# Patient Record
Sex: FEMALE | Race: WHITE | NOT HISPANIC OR LATINO | Employment: FULL TIME | URBAN - METROPOLITAN AREA
[De-identification: names, ages, dates, MRNs, and addresses within clinical notes are randomized per-mention and may not be internally consistent; named-entity substitution may affect disease eponyms.]

---

## 2019-09-16 ENCOUNTER — HOSPITAL ENCOUNTER (EMERGENCY)
Facility: HOSPITAL | Age: 39
Discharge: HOME | End: 2019-09-16
Attending: EMERGENCY MEDICINE | Admitting: EMERGENCY MEDICINE

## 2019-09-16 ENCOUNTER — APPOINTMENT (OUTPATIENT)
Dept: GENERAL RADIOLOGY | Facility: HOSPITAL | Age: 39
End: 2019-09-16

## 2019-09-16 VITALS
TEMPERATURE: 99.1 F | WEIGHT: 218 LBS | OXYGEN SATURATION: 100 % | DIASTOLIC BLOOD PRESSURE: 83 MMHG | RESPIRATION RATE: 18 BRPM | SYSTOLIC BLOOD PRESSURE: 115 MMHG | HEIGHT: 65 IN | BODY MASS INDEX: 36.32 KG/M2 | HEART RATE: 96 BPM

## 2019-09-16 DIAGNOSIS — R07.9 CHEST PAIN, UNSPECIFIED TYPE: Primary | ICD-10-CM

## 2019-09-16 DIAGNOSIS — R00.0 SINUS TACHYCARDIA: ICD-10-CM

## 2019-09-16 LAB
ALBUMIN SERPL-MCNC: 4.3 G/DL (ref 3.5–5.2)
ALBUMIN/GLOB SERPL: 1.1 G/DL
ALP SERPL-CCNC: 106 U/L (ref 39–117)
ALT SERPL W P-5'-P-CCNC: 13 U/L (ref 1–33)
ANION GAP SERPL CALCULATED.3IONS-SCNC: 12 MMOL/L (ref 5–15)
AST SERPL-CCNC: 12 U/L (ref 1–32)
BASOPHILS # BLD AUTO: 0.07 10*3/MM3 (ref 0–0.2)
BASOPHILS NFR BLD AUTO: 0.5 % (ref 0–1.5)
BILIRUB SERPL-MCNC: <0.2 MG/DL (ref 0.2–1.2)
BUN BLD-MCNC: 8 MG/DL (ref 6–20)
BUN/CREAT SERPL: 11.6 (ref 7–25)
CALCIUM SPEC-SCNC: 9.1 MG/DL (ref 8.6–10.5)
CHLORIDE SERPL-SCNC: 105 MMOL/L (ref 98–107)
CO2 SERPL-SCNC: 24 MMOL/L (ref 22–29)
CREAT BLD-MCNC: 0.69 MG/DL (ref 0.57–1)
D DIMER PPP FEU-MCNC: <0.27 MCGFEU/ML (ref 0–0.56)
DEPRECATED RDW RBC AUTO: 43.1 FL (ref 37–54)
EOSINOPHIL # BLD AUTO: 0.12 10*3/MM3 (ref 0–0.4)
EOSINOPHIL NFR BLD AUTO: 0.9 % (ref 0.3–6.2)
ERYTHROCYTE [DISTWIDTH] IN BLOOD BY AUTOMATED COUNT: 14 % (ref 12.3–15.4)
GFR SERPL CREATININE-BSD FRML MDRD: 95 ML/MIN/1.73
GLOBULIN UR ELPH-MCNC: 3.8 GM/DL
GLUCOSE BLD-MCNC: 109 MG/DL (ref 65–99)
HCT VFR BLD AUTO: 43.5 % (ref 34–46.6)
HGB BLD-MCNC: 14.2 G/DL (ref 12–15.9)
HOLD SPECIMEN: NORMAL
HOLD SPECIMEN: NORMAL
IMM GRANULOCYTES # BLD AUTO: 0.04 10*3/MM3 (ref 0–0.05)
IMM GRANULOCYTES NFR BLD AUTO: 0.3 % (ref 0–0.5)
LIPASE SERPL-CCNC: 20 U/L (ref 13–60)
LYMPHOCYTES # BLD AUTO: 3.26 10*3/MM3 (ref 0.7–3.1)
LYMPHOCYTES NFR BLD AUTO: 25.3 % (ref 19.6–45.3)
MCH RBC QN AUTO: 27.6 PG (ref 26.6–33)
MCHC RBC AUTO-ENTMCNC: 32.6 G/DL (ref 31.5–35.7)
MCV RBC AUTO: 84.5 FL (ref 79–97)
MONOCYTES # BLD AUTO: 0.84 10*3/MM3 (ref 0.1–0.9)
MONOCYTES NFR BLD AUTO: 6.5 % (ref 5–12)
NEUTROPHILS # BLD AUTO: 8.55 10*3/MM3 (ref 1.7–7)
NEUTROPHILS NFR BLD AUTO: 66.5 % (ref 42.7–76)
NRBC BLD AUTO-RTO: 0 /100 WBC (ref 0–0.2)
NT-PROBNP SERPL-MCNC: 15.2 PG/ML (ref 5–450)
PLATELET # BLD AUTO: 307 10*3/MM3 (ref 140–450)
PMV BLD AUTO: 11.8 FL (ref 6–12)
POTASSIUM BLD-SCNC: 3.5 MMOL/L (ref 3.5–5.2)
PROT SERPL-MCNC: 8.1 G/DL (ref 6–8.5)
RBC # BLD AUTO: 5.15 10*6/MM3 (ref 3.77–5.28)
SODIUM BLD-SCNC: 141 MMOL/L (ref 136–145)
TROPONIN T SERPL-MCNC: <0.01 NG/ML (ref 0–0.03)
TROPONIN T SERPL-MCNC: <0.01 NG/ML (ref 0–0.03)
TSH SERPL DL<=0.05 MIU/L-ACNC: 4 UIU/ML (ref 0.27–4.2)
WBC NRBC COR # BLD: 12.88 10*3/MM3 (ref 3.4–10.8)
WHOLE BLOOD HOLD SPECIMEN: NORMAL
WHOLE BLOOD HOLD SPECIMEN: NORMAL

## 2019-09-16 PROCEDURE — 71045 X-RAY EXAM CHEST 1 VIEW: CPT

## 2019-09-16 PROCEDURE — 85379 FIBRIN DEGRADATION QUANT: CPT | Performed by: PHYSICIAN ASSISTANT

## 2019-09-16 PROCEDURE — 85025 COMPLETE CBC W/AUTO DIFF WBC: CPT | Performed by: EMERGENCY MEDICINE

## 2019-09-16 PROCEDURE — 99284 EMERGENCY DEPT VISIT MOD MDM: CPT

## 2019-09-16 PROCEDURE — 80053 COMPREHEN METABOLIC PANEL: CPT | Performed by: EMERGENCY MEDICINE

## 2019-09-16 PROCEDURE — 93005 ELECTROCARDIOGRAM TRACING: CPT | Performed by: EMERGENCY MEDICINE

## 2019-09-16 PROCEDURE — 83880 ASSAY OF NATRIURETIC PEPTIDE: CPT | Performed by: EMERGENCY MEDICINE

## 2019-09-16 PROCEDURE — 84484 ASSAY OF TROPONIN QUANT: CPT | Performed by: EMERGENCY MEDICINE

## 2019-09-16 PROCEDURE — 84443 ASSAY THYROID STIM HORMONE: CPT | Performed by: PHYSICIAN ASSISTANT

## 2019-09-16 PROCEDURE — 83690 ASSAY OF LIPASE: CPT | Performed by: EMERGENCY MEDICINE

## 2019-09-16 RX ORDER — SODIUM CHLORIDE 0.9 % (FLUSH) 0.9 %
10 SYRINGE (ML) INJECTION AS NEEDED
Status: DISCONTINUED | OUTPATIENT
Start: 2019-09-16 | End: 2019-09-16 | Stop reason: HOSPADM

## 2019-09-16 RX ADMIN — SODIUM CHLORIDE 1000 ML: 9 INJECTION, SOLUTION INTRAVENOUS at 09:10

## 2019-09-16 NOTE — DISCHARGE INSTRUCTIONS
Follow up with one of the [facility] Primary Care Providers below to setup primary care. If you need assistance coordinating a primary care appointment with a [facility] Primary Care Provider, please contact the Primary Care Coordinators at [phone] for appointment scheduling.    [facility], Primary Care   [address] , [address]   [place], [place] [id number] [phone]    [facility] Internal Medicine & Endocrinology  [address], [address]  [place] [address] [phone]    [facility] Family Medicine  [address], [address]   [place] [address] [phone]    [facility] Primary Care  [address], [address]  [place], [place] [address] [phone]    [facility], Primary Care,   [address], [address]   [place], [place] [id number] [phone]    [facility] Primary Care  [address]  [place], [place] [id number] [phone]    [facility], Primary Care  [address], [address]  [place], [place] [address] [phone]    [facility] Internal Medicine & Pediatrics  [address], [address]   [place] [address] [phone]    [facility], Primary Care  [address] [place] [address] [phone]      [facility] Primary Care  [address], [address]   [place] [address] [phone]    [facility] Family Medicine  [address] [place] [address] [phone]

## 2019-09-16 NOTE — ED PROVIDER NOTES
Subjective   Pt is a 38 yo female presenting to ED with complaints of chest pain and elevated heart rate. She noticed mid sternal chest pressure about 7 am this morning. She works at [employer] and they took her HR there and noticed 110-120. Pt went to [facility] and then sent to ED. On arrival to ED chest pressure improved but still tachycardiac at 107. She denies headache, visual changes, dizziness, SOB, N/V, abdominal  Pain or leg swelling. She denies prior cardiac hx or cardiac workup. Her father has hx of Afib. She denies any excessive caffeine / energy drinks or drug use. She quit tobacco use 5 years ago. The only medication she takes is Depo.         History provided by:  Patient  Chest Pain   Pain location:  Substernal area  Pain quality: pressure    Pain radiates to:  Does not radiate  Pain severity:  Mild  Onset quality:  Sudden  Duration:  3 hours  Progression:  Improving  Chronicity:  New  Context: at rest    Relieved by:  Nothing  Worsened by:  Nothing  Ineffective treatments:  None tried  Associated symptoms: no abdominal pain, no anxiety, no back pain, no cough, no diaphoresis, no dizziness, no dysphagia, no fatigue, no fever, no headache, no lower extremity edema, no nausea, no near-syncope, no numbness, no palpitations, no shortness of breath, no syncope, no vomiting and no weakness    Risk factors: birth control and obesity    Risk factors: no coronary artery disease, no diabetes mellitus, no high cholesterol, no hypertension, no immobilization, no prior DVT/PE, no smoking and no surgery        Review of Systems   Constitutional: Negative for chills, diaphoresis, fatigue and fever.   HENT: Negative for congestion, ear pain, sore throat and trouble swallowing.    Eyes: Negative for pain, redness and visual disturbance.   Respiratory: Negative for cough, chest tightness and shortness of breath.    Cardiovascular: Positive for chest pain. Negative for palpitations, leg swelling, syncope and  near-syncope.   Gastrointestinal: Negative for abdominal pain, constipation, diarrhea, nausea and vomiting.   Genitourinary: Negative for difficulty urinating, dysuria, flank pain, hematuria and vaginal bleeding.   Musculoskeletal: Negative for arthralgias, back pain and joint swelling.   Skin: Negative for rash and wound.   Neurological: Negative for dizziness, syncope, speech difficulty, weakness, numbness and headaches.   Psychiatric/Behavioral: Negative for confusion.   All other systems reviewed and are negative.      History reviewed. No pertinent past medical history.    Allergies   Allergen Reactions   • Naproxen Anaphylaxis   • Biaxin [Clarithromycin] Rash   • Erythromycin Rash       Past Surgical History:   Procedure Laterality Date   • EYE SURGERY         History reviewed. No pertinent family history.    Social History     Socioeconomic History   • Marital status:      Spouse name: Not on file   • Number of children: Not on file   • Years of education: Not on file   • Highest education level: Not on file   Tobacco Use   • Smoking status: Former Smoker   • Tobacco comment: quit 5 years ago   Substance and Sexual Activity   • Alcohol use: No     Frequency: Never   • Drug use: No           Objective   Physical Exam   Constitutional: She is oriented to person, place, and time. Vital signs are normal. She appears well-developed.   HENT:   Head: Atraumatic.   Nose: Nose normal.   Mouth/Throat: Mucous membranes are normal.   Eyes: Conjunctivae, EOM and lids are normal. Pupils are equal, round, and reactive to light.   Neck: Normal range of motion. Neck supple.   Cardiovascular: Regular rhythm and normal heart sounds. Tachycardia present.   Pulmonary/Chest: Effort normal and breath sounds normal. She has no wheezes.   Abdominal: Soft. She exhibits no distension. There is no tenderness. There is no rebound and no guarding.   Musculoskeletal: Normal range of motion. She exhibits no edema or tenderness.    Neurological: She is alert and oriented to person, place, and time. No sensory deficit.   Skin: Skin is warm and dry. No rash noted. No erythema.   Psychiatric: She has a normal mood and affect. Her speech is normal and behavior is normal.   Nursing note and vitals reviewed.      Procedures           ED Course      RE-examined patient several times in ED. Pt feeling better and no recurrent CP. HR improved. Discussed results and tx plan for f/u with cardiac clinic. Rx for Metoprolol 25 mg provided. Pt instructed to only take if HR over 100 and BP over 120/80 with max dose of BID. She will return to ED if new or worse sx.     Discussed patient with Dr. Smalls who is agreeable with ED course and tx plan. Will Rx Metoprolol as discussed above.     Recent Results (from the past 24 hour(s))   Troponin    Collection Time: 09/16/19  8:51 AM   Result Value Ref Range    Troponin T <0.010 0.000 - 0.030 ng/mL   Comprehensive Metabolic Panel    Collection Time: 09/16/19  8:51 AM   Result Value Ref Range    Glucose 109 (H) 65 - 99 mg/dL    BUN 8 6 - 20 mg/dL    Creatinine 0.69 0.57 - 1.00 mg/dL    Sodium 141 136 - 145 mmol/L    Potassium 3.5 3.5 - 5.2 mmol/L    Chloride 105 98 - 107 mmol/L    CO2 24.0 22.0 - 29.0 mmol/L    Calcium 9.1 8.6 - 10.5 mg/dL    Total Protein 8.1 6.0 - 8.5 g/dL    Albumin 4.30 3.50 - 5.20 g/dL    ALT (SGPT) 13 1 - 33 U/L    AST (SGOT) 12 1 - 32 U/L    Alkaline Phosphatase 106 39 - 117 U/L    Total Bilirubin <0.2 (L) 0.2 - 1.2 mg/dL    eGFR Non African Amer 95 >60 mL/min/1.73    Globulin 3.8 gm/dL    A/G Ratio 1.1 g/dL    BUN/Creatinine Ratio 11.6 7.0 - 25.0    Anion Gap 12.0 5.0 - 15.0 mmol/L   Lipase    Collection Time: 09/16/19  8:51 AM   Result Value Ref Range    Lipase 20 13 - 60 U/L   BNP    Collection Time: 09/16/19  8:51 AM   Result Value Ref Range    proBNP 15.2 5.0 - 450.0 pg/mL   Light Blue Top    Collection Time: 09/16/19  8:51 AM   Result Value Ref Range    Extra Tube hold for add-on     Green Top (Gel)    Collection Time: 09/16/19  8:51 AM   Result Value Ref Range    Extra Tube Hold for add-ons.    Lavender Top    Collection Time: 09/16/19  8:51 AM   Result Value Ref Range    Extra Tube hold for add-on    Gold Top - SST    Collection Time: 09/16/19  8:51 AM   Result Value Ref Range    Extra Tube Hold for add-ons.    CBC Auto Differential    Collection Time: 09/16/19  8:51 AM   Result Value Ref Range    WBC 12.88 (H) 3.40 - 10.80 10*3/mm3    RBC 5.15 3.77 - 5.28 10*6/mm3    Hemoglobin 14.2 12.0 - 15.9 g/dL    Hematocrit 43.5 34.0 - 46.6 %    MCV 84.5 79.0 - 97.0 fL    MCH 27.6 26.6 - 33.0 pg    MCHC 32.6 31.5 - 35.7 g/dL    RDW 14.0 12.3 - 15.4 %    RDW-SD 43.1 37.0 - 54.0 fl    MPV 11.8 6.0 - 12.0 fL    Platelets 307 140 - 450 10*3/mm3    Neutrophil % 66.5 42.7 - 76.0 %    Lymphocyte % 25.3 19.6 - 45.3 %    Monocyte % 6.5 5.0 - 12.0 %    Eosinophil % 0.9 0.3 - 6.2 %    Basophil % 0.5 0.0 - 1.5 %    Immature Grans % 0.3 0.0 - 0.5 %    Neutrophils, Absolute 8.55 (H) 1.70 - 7.00 10*3/mm3    Lymphocytes, Absolute 3.26 (H) 0.70 - 3.10 10*3/mm3    Monocytes, Absolute 0.84 0.10 - 0.90 10*3/mm3    Eosinophils, Absolute 0.12 0.00 - 0.40 10*3/mm3    Basophils, Absolute 0.07 0.00 - 0.20 10*3/mm3    Immature Grans, Absolute 0.04 0.00 - 0.05 10*3/mm3    nRBC 0.0 0.0 - 0.2 /100 WBC   TSH    Collection Time: 09/16/19  8:51 AM   Result Value Ref Range    TSH 4.000 0.270 - 4.200 uIU/mL   D-dimer, Quantitative    Collection Time: 09/16/19  8:51 AM   Result Value Ref Range    D-Dimer, Quantitative <0.27 0.00 - 0.56 MCGFEU/mL   Troponin    Collection Time: 09/16/19 10:32 AM   Result Value Ref Range    Troponin T <0.010 0.000 - 0.030 ng/mL     Note: In addition to lab results from this visit, the labs listed above may include labs taken at another facility or during a different encounter within the last 24 hours. Please correlate lab times with ED admission and discharge times for further clarification of the  services performed during this visit.    XR Chest 1 View   Final Result   Mild chronic changes in the lung fields with no evidence of   acute parenchymal disease.       D:  09/16/2019   E:  09/16/2019       This report was finalized on 9/16/2019 4:14 PM by Dr. Trinh Felder MD.            Vitals:    09/16/19 0945 09/16/19 1000 09/16/19 1045 09/16/19 1140   BP: 131/86 124/80 115/83    BP Location:       Patient Position:       Pulse: 103 91 108 96   Resp:       Temp:       SpO2: 100% 100% 100%    Weight:       Height:         Medications   sodium chloride 0.9 % bolus 1,000 mL (0 mL Intravenous Stopped 9/16/19 1010)     ECG/EMG Results (last 24 hours)     Procedure Component Value Units Date/Time    ECG 12 Lead [[id number]] Collected:  09/16/19 0841     Updated:  09/16/19 0858    ECG 12 Lead [[id number]] Collected:  09/16/19 1029     Updated:  09/16/19 1127    Narrative:       Test Reason : 2ND SET  Blood Pressure : **/** mmHG  Vent. Rate : 091 BPM     Atrial Rate : 091 BPM     P-R Int : 128 ms          QRS Dur : 074 ms      QT Int : 360 ms       P-R-T Axes : 029 013 017 degrees     QTc Int : 442 ms    Normal sinus rhythm  Normal ECG  When compared with ECG of 16-SEP-2019 08:41, (Unconfirmed)  No significant change was found  Confirmed by JENI MACHADO MD ([id number]) on 9/16/2019 11:26:54 AM    Referred By:  RUBY           Confirmed By:JENI MACHADO MD        ECG 12 Lead   Final Result   Test Reason : 2ND SET   Blood Pressure : **/** mmHG   Vent. Rate : 091 BPM     Atrial Rate : 091 BPM      P-R Int : 128 ms          QRS Dur : 074 ms       QT Int : 360 ms       P-R-T Axes : 029 013 017 degrees      QTc Int : 442 ms      Normal sinus rhythm   Normal ECG   When compared with ECG of 16-SEP-2019 08:41, (Unconfirmed)   No significant change was found   Confirmed by JENI MACHADO MD ([id number]) on 9/16/2019 11:26:54 AM      Referred By:  RUBY           Confirmed By:JENI MACHADO MD      ECG 12 Lead                          MDM    Final diagnoses:   Chest pain, unspecified type   Sinus tachycardia              Emeli Acosta, PA  09/16/19 5300

## 2019-09-18 NOTE — PROGRESS NOTES
"[facility]  Heart and Valve Center      Encounter Date:09/19/2019     Sherlyn Brewer  [address] [place] [address] [address] [place] [address]  [unfilled]    1980    Provider, No Known    Sherlyn Brewer is a 39 y.o. female.      Subjective:     Chief Complaint:  Chest Pain and Establish Care       HPI   Patient is a 39-year-old female with no prior cardiac history who presents to the chest pain clinic at the request of Emeli Acosta/Dr. Smalls.  Patient presented to the ED on 9/16/2019 with acute midsternal chest pressure.  Symptoms developed while she was at work and her coworkers to her heart rate and noticed it to be around 110-120.  Patient presented to the [facility] and I sent her to the ED.  Heart rate was 107 upon arrival to the ED.  Work-up in ED was unremarkable.  She was started on metoprolol tartrate PRN HR >100. She has not taken any doses. She reports typical resting HR in the 80s but now mostly 90-100s. No irregular heart beats. Episodes of heart racing when standing up to 135 according to her fitbit. Does notice racing heart beat worse when going from sitting to standing. Chest pressure intermittent but not always related to fast heart rast.  Described as a \"Dull ache\" at rest that occurs randomly and last for approximately 30 minutes.  No associated cough or palpable tenderness.  No exertional chest pain. No recent infection. No stimulants.     Cardiac Risks: Obesity, sedentary lifestyle, father has afib (diagnosed in the 40s?)      History reviewed. No pertinent past medical history.    Past Surgical History:   Procedure Laterality Date   • EYE SURGERY         Family History   Problem Relation Age of Onset   • Depression Mother    • Anxiety disorder Mother    • Atrial fibrillation Father    • Depression Sister    • Asthma Sister    • Hypertension Maternal Grandmother    • Kidney disease Maternal Grandfather    • Heart block Maternal Grandfather    • No Known Problems Paternal Grandmother    • No " Known Problems Paternal Grandfather        Social History     Socioeconomic History   • Marital status:      Spouse name: Not on file   • Number of children: Not on file   • Years of education: Not on file   • Highest education level: Not on file   Tobacco Use   • Smoking status: Former Smoker   • Smokeless tobacco: Never Used   • Tobacco comment: quit 5 years ago   Substance and Sexual Activity   • Alcohol use: No     Frequency: Never   • Drug use: No   • Sexual activity: Defer   Social History Narrative    caffeine use: occassionally       Allergies   Allergen Reactions   • Naproxen Anaphylaxis   • Biaxin [Clarithromycin] Rash   • Erythromycin Rash         Current Outpatient Medications:   •  metoprolol tartrate (LOPRESSOR) 25 MG tablet, Take 1 tablet by mouth 2 (Two) Times a Day for 14 days. (Patient taking differently: Take 25 mg by mouth Daily As Needed (HR >100).), Disp: 28 tablet, Rfl: 0    The following portions of the patient's history were reviewed today and updated as appropriate: allergies, current medications, past family history, past medical history, past social history, past surgical history and problem list     Review of Systems   Constitution: Positive for weakness and malaise/fatigue. Negative for chills and fever.   HENT: Negative.    Eyes: Negative.    Cardiovascular: Positive for chest pain, dyspnea on exertion and palpitations. Negative for claudication, cyanosis, irregular heartbeat, leg swelling, near-syncope, orthopnea, paroxysmal nocturnal dyspnea and syncope.   Respiratory: Negative for cough, shortness of breath and snoring.    Endocrine: Negative.    Hematologic/Lymphatic: Does not bruise/bleed easily.   Skin: Negative for poor wound healing.   Musculoskeletal: Negative.    Gastrointestinal: Negative for abdominal pain, heartburn, hematemesis, melena, nausea and vomiting.   Genitourinary: Negative.  Negative for hematuria.   Psychiatric/Behavioral: Negative.   "  Allergic/Immunologic: Negative.        Objective:     Vitals:    09/19/19 0855 09/19/19 0858 09/19/19 0859   BP: 120/84 123/86 112/82   BP Location: Right arm Left arm Left arm   Patient Position: Sitting Sitting Standing   Cuff Size: Adult Adult Adult   Pulse: 100  112   Resp: 18     Temp: 98 °F (36.7 °C)     TempSrc: Temporal     SpO2: 98%  99%   Weight: 99.9 kg (220 lb 4 oz)     Height: 165.1 cm (65\")         Physical Exam   Constitutional: She is oriented to person, place, and time. She appears well-developed and well-nourished. No distress.   HENT:   Head: Normocephalic.   Eyes: Conjunctivae are normal. Pupils are equal, round, and reactive to light.   Neck: Neck supple. No JVD present. No thyromegaly present.   Cardiovascular: Normal rate, regular rhythm, normal heart sounds and intact distal pulses. Exam reveals no gallop and no friction rub.   No murmur heard.  Pulmonary/Chest: Effort normal and breath sounds normal. No respiratory distress. She has no wheezes. She has no rales. She exhibits no tenderness.   Abdominal: Soft. Bowel sounds are normal.   Musculoskeletal: Normal range of motion. She exhibits no edema.   Neurological: She is alert and oriented to person, place, and time.   Skin: Skin is warm and dry.   Psychiatric: She has a normal mood and affect. Her behavior is normal. Thought content normal.   Vitals reviewed.      Lab and Diagnostic Review:  Results for orders placed or performed during the hospital encounter of 09/16/19   Troponin   Result Value Ref Range    Troponin T <0.010 0.000 - 0.030 ng/mL   Comprehensive Metabolic Panel   Result Value Ref Range    Glucose 109 (H) 65 - 99 mg/dL    BUN 8 6 - 20 mg/dL    Creatinine 0.69 0.57 - 1.00 mg/dL    Sodium 141 136 - 145 mmol/L    Potassium 3.5 3.5 - 5.2 mmol/L    Chloride 105 98 - 107 mmol/L    CO2 24.0 22.0 - 29.0 mmol/L    Calcium 9.1 8.6 - 10.5 mg/dL    Total Protein 8.1 6.0 - 8.5 g/dL    Albumin 4.30 3.50 - 5.20 g/dL    ALT (SGPT) 13 1 - " 33 U/L    AST (SGOT) 12 1 - 32 U/L    Alkaline Phosphatase 106 39 - 117 U/L    Total Bilirubin <0.2 (L) 0.2 - 1.2 mg/dL    eGFR Non African Amer 95 >60 mL/min/1.73    Globulin 3.8 gm/dL    A/G Ratio 1.1 g/dL    BUN/Creatinine Ratio 11.6 7.0 - 25.0    Anion Gap 12.0 5.0 - 15.0 mmol/L   Lipase   Result Value Ref Range    Lipase 20 13 - 60 U/L   BNP   Result Value Ref Range    proBNP 15.2 5.0 - 450.0 pg/mL   CBC Auto Differential   Result Value Ref Range    WBC 12.88 (H) 3.40 - 10.80 10*3/mm3    RBC 5.15 3.77 - 5.28 10*6/mm3    Hemoglobin 14.2 12.0 - 15.9 g/dL    Hematocrit 43.5 34.0 - 46.6 %    MCV 84.5 79.0 - 97.0 fL    MCH 27.6 26.6 - 33.0 pg    MCHC 32.6 31.5 - 35.7 g/dL    RDW 14.0 12.3 - 15.4 %    RDW-SD 43.1 37.0 - 54.0 fl    MPV 11.8 6.0 - 12.0 fL    Platelets 307 140 - 450 10*3/mm3    Neutrophil % 66.5 42.7 - 76.0 %    Lymphocyte % 25.3 19.6 - 45.3 %    Monocyte % 6.5 5.0 - 12.0 %    Eosinophil % 0.9 0.3 - 6.2 %    Basophil % 0.5 0.0 - 1.5 %    Immature Grans % 0.3 0.0 - 0.5 %    Neutrophils, Absolute 8.55 (H) 1.70 - 7.00 10*3/mm3    Lymphocytes, Absolute 3.26 (H) 0.70 - 3.10 10*3/mm3    Monocytes, Absolute 0.84 0.10 - 0.90 10*3/mm3    Eosinophils, Absolute 0.12 0.00 - 0.40 10*3/mm3    Basophils, Absolute 0.07 0.00 - 0.20 10*3/mm3    Immature Grans, Absolute 0.04 0.00 - 0.05 10*3/mm3    nRBC 0.0 0.0 - 0.2 /100 WBC   TSH   Result Value Ref Range    TSH 4.000 0.270 - 4.200 uIU/mL   D-dimer, Quantitative   Result Value Ref Range    D-Dimer, Quantitative <0.27 0.00 - 0.56 MCGFEU/mL   Troponin   Result Value Ref Range    Troponin T <0.010 0.000 - 0.030 ng/mL     EKG 9/16/19 NSR     Assessment and Plan:   1. Chest pain, unspecified type  Atypical symptoms. Patient also defers stress test at this time due to cost. Will first evaluation tachycardia with holter and echo, will consider GXT if abnormal findings    2. Tachycardia  Patient wants lease expensive option, will do 48hr holter, which should be sufficient due to  frequency of symptoms  - Adult Transthoracic Echo Complete W/ Cont if Necessary Per Protocol; Future  - Holter Monitor - 48 Hour; Future    3. VELASQUEZ (dyspnea on exertion)  - Adult Transthoracic Echo Complete W/ Cont if Necessary Per Protocol; Future      RV in 2 weeks    It has been a pleasure to participate in the care of this patient.  Patient was instructed to call the Heart and Valve Center with any questions, concerns, or worsening symptoms.    *Please note that portions of this note were completed with a voice recognition program. Efforts were made to edit the dictations, but occasionally words are mistranscribed.

## 2019-09-19 ENCOUNTER — HOSPITAL ENCOUNTER (OUTPATIENT)
Dept: CARDIOLOGY | Facility: HOSPITAL | Age: 39
Discharge: HOME | End: 2019-09-19
Admitting: NURSE PRACTITIONER

## 2019-09-19 ENCOUNTER — OFFICE VISIT (OUTPATIENT)
Dept: CARDIOLOGY | Facility: HOSPITAL | Age: 39
End: 2019-09-19

## 2019-09-19 VITALS — HEIGHT: 65 IN | WEIGHT: 220 LBS | BODY MASS INDEX: 36.65 KG/M2

## 2019-09-19 VITALS
DIASTOLIC BLOOD PRESSURE: 82 MMHG | WEIGHT: 220.25 LBS | RESPIRATION RATE: 18 BRPM | HEIGHT: 65 IN | BODY MASS INDEX: 36.69 KG/M2 | TEMPERATURE: 98 F | SYSTOLIC BLOOD PRESSURE: 112 MMHG | HEART RATE: 112 BPM | OXYGEN SATURATION: 99 %

## 2019-09-19 DIAGNOSIS — R00.0 TACHYCARDIA: ICD-10-CM

## 2019-09-19 DIAGNOSIS — R07.9 CHEST PAIN, UNSPECIFIED TYPE: ICD-10-CM

## 2019-09-19 DIAGNOSIS — R06.09 DOE (DYSPNEA ON EXERTION): ICD-10-CM

## 2019-09-19 DIAGNOSIS — R07.9 CHEST PAIN, UNSPECIFIED TYPE: Primary | ICD-10-CM

## 2019-09-19 LAB
BH CV ECHO MEAS - AO ROOT AREA (BSA CORRECTED): 1.3
BH CV ECHO MEAS - AO ROOT AREA: 5.8 CM^2
BH CV ECHO MEAS - AO ROOT DIAM: 2.7 CM
BH CV ECHO MEAS - BSA(HAYCOCK): 2.2 M^2
BH CV ECHO MEAS - BSA: 2.1 M^2
BH CV ECHO MEAS - BZI_BMI: 36.6 KILOGRAMS/M^2
BH CV ECHO MEAS - BZI_METRIC_HEIGHT: 165.1 CM
BH CV ECHO MEAS - BZI_METRIC_WEIGHT: 99.8 KG
BH CV ECHO MEAS - EDV(CUBED): 64.2 ML
BH CV ECHO MEAS - EDV(MOD-SP2): 119 ML
BH CV ECHO MEAS - EDV(MOD-SP4): 114 ML
BH CV ECHO MEAS - EDV(TEICH): 70.2 ML
BH CV ECHO MEAS - EF(CUBED): 66.4 %
BH CV ECHO MEAS - EF(MOD-BP): 55 %
BH CV ECHO MEAS - EF(MOD-SP2): 59.7 %
BH CV ECHO MEAS - EF(MOD-SP4): 51.8 %
BH CV ECHO MEAS - EF(TEICH): 58.5 %
BH CV ECHO MEAS - ESV(CUBED): 21.6 ML
BH CV ECHO MEAS - ESV(MOD-SP2): 48 ML
BH CV ECHO MEAS - ESV(MOD-SP4): 55 ML
BH CV ECHO MEAS - ESV(TEICH): 29.1 ML
BH CV ECHO MEAS - FS: 30.5 %
BH CV ECHO MEAS - IVS/LVPW: 0.71
BH CV ECHO MEAS - IVSD: 0.81 CM
BH CV ECHO MEAS - LA DIMENSION: 2.6 CM
BH CV ECHO MEAS - LA/AO: 0.97
BH CV ECHO MEAS - LAD MAJOR: 5.1 CM
BH CV ECHO MEAS - LAT PEAK E' VEL: 12.9 CM/SEC
BH CV ECHO MEAS - LATERAL E/E' RATIO: 6
BH CV ECHO MEAS - LV DIASTOLIC VOL/BSA (35-75): 55.3 ML/M^2
BH CV ECHO MEAS - LV MASS(C)D: 122.9 GRAMS
BH CV ECHO MEAS - LV MASS(C)DI: 59.6 GRAMS/M^2
BH CV ECHO MEAS - LV MAX PG: 3.5 MMHG
BH CV ECHO MEAS - LV MEAN PG: 1.7 MMHG
BH CV ECHO MEAS - LV SYSTOLIC VOL/BSA (12-30): 26.7 ML/M^2
BH CV ECHO MEAS - LV V1 MAX: 93.8 CM/SEC
BH CV ECHO MEAS - LV V1 MEAN: 58.4 CM/SEC
BH CV ECHO MEAS - LV V1 VTI: 24.5 CM
BH CV ECHO MEAS - LVIDD: 4 CM
BH CV ECHO MEAS - LVIDS: 2.8 CM
BH CV ECHO MEAS - LVLD AP2: 8.5 CM
BH CV ECHO MEAS - LVLD AP4: 8.7 CM
BH CV ECHO MEAS - LVLS AP2: 7 CM
BH CV ECHO MEAS - LVLS AP4: 7.5 CM
BH CV ECHO MEAS - LVOT AREA (M): 2.3 CM^2
BH CV ECHO MEAS - LVOT AREA: 2.2 CM^2
BH CV ECHO MEAS - LVOT DIAM: 1.7 CM
BH CV ECHO MEAS - LVPWD: 1.1 CM
BH CV ECHO MEAS - MED PEAK E' VEL: 8.8 CM/SEC
BH CV ECHO MEAS - MEDIAL E/E' RATIO: 8.7
BH CV ECHO MEAS - MV A MAX VEL: 66.1 CM/SEC
BH CV ECHO MEAS - MV DEC TIME: 0.17 SEC
BH CV ECHO MEAS - MV E MAX VEL: 78 CM/SEC
BH CV ECHO MEAS - MV E/A: 1.2
BH CV ECHO MEAS - PA ACC SLOPE: 610.5 CM/SEC^2
BH CV ECHO MEAS - PA ACC TIME: 0.11 SEC
BH CV ECHO MEAS - PA PR(ACCEL): 29.9 MMHG
BH CV ECHO MEAS - RAP SYSTOLE: 8 MMHG
BH CV ECHO MEAS - RVSP: 34 MMHG
BH CV ECHO MEAS - SI(CUBED): 20.7 ML/M^2
BH CV ECHO MEAS - SI(LVOT): 26.7 ML/M^2
BH CV ECHO MEAS - SI(MOD-SP2): 34.5 ML/M^2
BH CV ECHO MEAS - SI(MOD-SP4): 28.6 ML/M^2
BH CV ECHO MEAS - SI(TEICH): 19.9 ML/M^2
BH CV ECHO MEAS - SV(CUBED): 42.6 ML
BH CV ECHO MEAS - SV(LVOT): 54.9 ML
BH CV ECHO MEAS - SV(MOD-SP2): 71 ML
BH CV ECHO MEAS - SV(MOD-SP4): 59 ML
BH CV ECHO MEAS - SV(TEICH): 41.1 ML
BH CV ECHO MEAS - TAPSE (>1.6): 2.4 CM2
BH CV ECHO MEAS - TR MAX PG: 26 MMHG
BH CV ECHO MEAS - TR MAX VEL: 252.5 CM/SEC
BH CV ECHO MEASUREMENTS AVERAGE E/E' RATIO: 7.19
BH CV VAS BP LEFT ARM: NORMAL MMHG
BH CV XLRA - RV BASE: 3.7 CM
BH CV XLRA - RV LENGTH: 5.1 CM
BH CV XLRA - RV MID: 2.8 CM
BH CV XLRA - TDI S': 12.3 CM/SEC
LEFT ATRIUM VOLUME INDEX: 21.4 ML/M^2
LEFT ATRIUM VOLUME: 44 ML
LV EF 2D ECHO EST: 60 %
MAXIMAL PREDICTED HEART RATE: 181 BPM
STRESS TARGET HR: 154 BPM

## 2019-09-19 PROCEDURE — 99214 OFFICE O/P EST MOD 30 MIN: CPT | Performed by: NURSE PRACTITIONER

## 2019-09-19 PROCEDURE — 93306 TTE W/DOPPLER COMPLETE: CPT | Performed by: INTERNAL MEDICINE

## 2019-09-19 PROCEDURE — 93306 TTE W/DOPPLER COMPLETE: CPT

## 2019-09-19 PROCEDURE — 93225 XTRNL ECG REC<48 HRS REC: CPT

## 2019-09-19 NOTE — PROGRESS NOTES
[facility] Heart Monitor Documentation    Sherlyn OconnorKinney  1980  [id number]  09/19/19    KAYLEY Spaulding    [] ZIO XT Patch  Model [id number] Prescribed for N/A Days    · Serial Number: (N + 9 Digits) N   · Apply-By Date on Box:   · USPS Tracking Number:   · USPS Tracking        [] [employer]ian MINI PLUS Mobile Cardiac Telemetry  Model BGMINIPLUS Prescribed for N/A Days    · Serial Number: (BGM + 7 Digits) BGM  · Shipped-By Date on Box:   · UPS Tracking Number: [id number]  · UPS Tracking      [x] [employer]ian MINI Holter Monitor  Model BGMINIEL Prescribed for 2 Days    · Serial Number: (7 Digits) [id number]  · Shipped-By Date on Box: 09/13/2019  · UPS Tracking Number: [id number]  · UPS Tracking        This monitor was applied to the patient's chest and checked for proper functioning.  Ms. Sehrlyn Brewer was instructed in the proper use of this monitor.  She was given the opportunity to ask questions and left the office with the device 's instruction manual.    Maria Del Rosario Corey MA, 9:39 AM, 09/19/19                  [facility]

## 2019-09-27 PROCEDURE — 93227 XTRNL ECG REC<48 HR R&I: CPT | Performed by: INTERNAL MEDICINE

## 2019-10-03 NOTE — PROGRESS NOTES
[facility]  Heart and Valve Center      Encounter Date:09/19/2019     Sherlyn Brewer  [address] [place] [address] [address] [place] [address]  [unfilled]    1980    Provider, No Known    Sherlyn Brewer is a 39 y.o. female.      Subjective:     Chief Complaint:  Follow-up palpitations       HPI   Patient is a 39-year-old female with no prior cardiac history who presents to the heart and valve center for follow upon palpitations.  Patient wore monitor for approximately 48 hours which was negative for arrhythmias my average heart rate was 99. Triggered episodes were consistent with sinus tachycardia. Echo was normal. Chest pain has mostly resolved. She still has ongoing fatigue. She did have recent GI bug. She had IVF and symptoms improved          Cardiac Risks: Obesity, sedentary lifestyle, father has afib (diagnosed in the 40s?)      No past medical history on file.    Past Surgical History:   Procedure Laterality Date   • EYE SURGERY         Family History   Problem Relation Age of Onset   • Depression Mother    • Anxiety disorder Mother    • Atrial fibrillation Father    • Depression Sister    • Asthma Sister    • Hypertension Maternal Grandmother    • Kidney disease Maternal Grandfather    • Heart block Maternal Grandfather    • No Known Problems Paternal Grandmother    • No Known Problems Paternal Grandfather        Social History     Socioeconomic History   • Marital status:      Spouse name: Not on file   • Number of children: Not on file   • Years of education: Not on file   • Highest education level: Not on file   Tobacco Use   • Smoking status: Former Smoker   • Smokeless tobacco: Never Used   • Tobacco comment: quit 5 years ago   Substance and Sexual Activity   • Alcohol use: No     Frequency: Never   • Drug use: No   • Sexual activity: Defer   Social History Narrative    caffeine use: occassionally       Allergies   Allergen Reactions   • Naproxen Anaphylaxis   • Biaxin [Clarithromycin]  "Rash   • Erythromycin Rash         Current Outpatient Medications:   •  medroxyPROGESTERone (DEPO-PROVERA) 150 MG/ML injection, Inject 150 mg into the appropriate muscle as directed by prescriber Every 3 (Three) Months., Disp: , Rfl:   •  LOTEMAX 0.5 % gel ophthalmic gel, INSTILL 1 DROP 4 TIMES DAILY IN OPERATED EYE FOR 1 MONTH THEN TWICE DAILY FOR 1 MONTH THEN ONCE DAILY FOR 1 MONTH, Disp: , Rfl: 2    The following portions of the patient's history were reviewed today and updated as appropriate: allergies, current medications, past family history, past medical history, past social history, past surgical history and problem list     Review of Systems   Constitution: Positive for weakness and malaise/fatigue. Negative for chills and fever.   HENT: Negative.    Eyes: Negative.    Cardiovascular: Negative for chest pain, claudication, cyanosis, dyspnea on exertion, irregular heartbeat, leg swelling, near-syncope, orthopnea, paroxysmal nocturnal dyspnea and syncope.   Respiratory: Negative for cough, shortness of breath and snoring.    Endocrine: Negative.    Hematologic/Lymphatic: Does not bruise/bleed easily.   Skin: Negative for poor wound healing.   Musculoskeletal: Negative.    Gastrointestinal: Negative for abdominal pain, heartburn, hematemesis, melena, nausea and vomiting.   Genitourinary: Negative.  Negative for hematuria.   Psychiatric/Behavioral: Negative.    Allergic/Immunologic: Negative.        Objective:     Vitals:    10/04/19 1042   BP: 128/80   BP Location: Right arm   Patient Position: Sitting   Cuff Size: Adult   Pulse: 88   Resp: 20   Temp: 98 °F (36.7 °C)   TempSrc: Temporal   SpO2: 98%   Weight: 99.4 kg (219 lb 2 oz)   Height: 165.1 cm (65\")       Physical Exam   Constitutional: She is oriented to person, place, and time. She appears well-developed and well-nourished. No distress.   HENT:   Head: Normocephalic.   Eyes: Conjunctivae are normal. Pupils are equal, round, and reactive to light. "   Neck: Neck supple. No JVD present. No thyromegaly present.   Cardiovascular: Normal rate, regular rhythm, normal heart sounds and intact distal pulses. Exam reveals no gallop and no friction rub.   No murmur heard.  Pulmonary/Chest: Effort normal and breath sounds normal. No respiratory distress. She has no wheezes. She has no rales. She exhibits no tenderness.   Abdominal: Soft. Bowel sounds are normal.   Musculoskeletal: Normal range of motion. She exhibits no edema.   Neurological: She is alert and oriented to person, place, and time.   Skin: Skin is warm and dry.   Psychiatric: She has a normal mood and affect. Her behavior is normal. Thought content normal.   Vitals reviewed.      Lab and Diagnostic Review:  Echo 9/19/19  · Left ventricular systolic function is normal. Estimated EF = 60%.  · The cardiac valves are anatomically and functionally normal.  · Estimated right ventricular systolic pressure from tricuspid regurgitation is normal (<35 mmHg).    48-hour Holter monitor showed a normal sinus rhythm with an average heart of 99, minimum heart of 62 and maximum heart of 153.  No arrhythmias.  Rare PACs and no PVCs    Assessment and Plan:   1. Sinus tachycardia  Mild episodes noted on monitor. WBC was elevated in ED and she recently had GI bug which likely contributed. She also has some concern for anxiety. D-dimer normal. Echo normal. Advised regular exercise to help with possible deconditioning. Keep log of symptoms to see if they are related to anxiety and fu with PCP for further evaluation. If anxiety contributes patient may benefit from propranolol    2. Fatigue  Normal TSH, ? Fatigue  Advised fu with PCP    RV PRN      It has been a pleasure to participate in the care of this patient.  Patient was instructed to call the [facility] Center with any questions, concerns, or worsening symptoms.    *Please note that portions of this note were completed with a voice recognition program. Efforts were made  to edit the dictations, but occasionally words are mistranscribed.

## 2019-10-04 ENCOUNTER — OFFICE VISIT (OUTPATIENT)
Dept: CARDIOLOGY | Facility: HOSPITAL | Age: 39
End: 2019-10-04

## 2019-10-04 VITALS
RESPIRATION RATE: 20 BRPM | WEIGHT: 219.13 LBS | HEART RATE: 88 BPM | HEIGHT: 65 IN | OXYGEN SATURATION: 98 % | TEMPERATURE: 98 F | DIASTOLIC BLOOD PRESSURE: 80 MMHG | SYSTOLIC BLOOD PRESSURE: 128 MMHG | BODY MASS INDEX: 36.51 KG/M2

## 2019-10-04 DIAGNOSIS — R00.0 SINUS TACHYCARDIA: Primary | ICD-10-CM

## 2019-10-04 DIAGNOSIS — R53.83 OTHER FATIGUE: ICD-10-CM

## 2019-10-04 PROCEDURE — 99213 OFFICE O/P EST LOW 20 MIN: CPT | Performed by: NURSE PRACTITIONER

## 2019-10-04 RX ORDER — LOTEPREDNOL ETABONATE 5 MG/G
GEL OPHTHALMIC
Refills: 2 | COMMUNITY
Start: 2019-09-12

## 2019-10-04 RX ORDER — MEDROXYPROGESTERONE ACETATE 150 MG/ML
150 INJECTION, SUSPENSION INTRAMUSCULAR
COMMUNITY